# Patient Record
Sex: MALE | Race: WHITE
[De-identification: names, ages, dates, MRNs, and addresses within clinical notes are randomized per-mention and may not be internally consistent; named-entity substitution may affect disease eponyms.]

---

## 2022-11-11 ENCOUNTER — HOSPITAL ENCOUNTER (OUTPATIENT)
Dept: HOSPITAL 76 - EMS | Age: 67
Discharge: ANOTHER HOSPITAL | End: 2022-11-11
Payer: MEDICARE
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
## 2022-11-11 DIAGNOSIS — S09.90XA: Primary | ICD-10-CM

## 2022-11-11 DIAGNOSIS — R11.10: ICD-10-CM

## 2022-11-11 DIAGNOSIS — R41.89: ICD-10-CM

## 2022-12-21 ENCOUNTER — HOSPITAL ENCOUNTER (OUTPATIENT)
Dept: HOSPITAL 76 - DI | Age: 67
Discharge: HOME | End: 2022-12-21
Attending: NURSE PRACTITIONER
Payer: MEDICARE

## 2022-12-21 DIAGNOSIS — S32.000D: ICD-10-CM

## 2022-12-21 DIAGNOSIS — M47.816: ICD-10-CM

## 2022-12-21 DIAGNOSIS — I60.9: Primary | ICD-10-CM

## 2022-12-21 PROCEDURE — 72040 X-RAY EXAM NECK SPINE 2-3 VW: CPT

## 2022-12-21 PROCEDURE — 70553 MRI BRAIN STEM W/O & W/DYE: CPT

## 2022-12-21 PROCEDURE — 72100 X-RAY EXAM L-S SPINE 2/3 VWS: CPT

## 2022-12-22 NOTE — XRAY REPORT
PROCEDURE:  Lumbar Spine 2 View

 

INDICATIONS:  Compression fractures of lumbar vertebrae with routine healing, unspecified lumbar vert
ebral level, subsequent encounter

 

TECHNIQUE:  2 views of the lumbar spine were acquired.  

 

COMPARISON:  None.

 

FINDINGS:  

 

Bones:  5 non-rib-bearing vertebrae are present.    No substantial curvature of the lumbar spine. 3 m
m retrolisthesis L3 on L4. Mild superior endplate compression deformity of L2, approximately one thir
d vertebral body height loss. Minimal superior endplate compression deformity of L3, approximately 10
% vertebral body height loss. Superior endplate compression deformity of L5, approximately one quarte
r vertebral body height loss. Mild multilevel degenerative changes of the lumbar spine.

 

No suspicious bony lesions.  

 

Soft tissues:  Overlying bowel gas pattern is normal. Vascular calcifications are present.

 

IMPRESSION:  

1. Compression deformities of L2, L3, and L5.

2. Multilevel degenerative changes.

 

Reviewed by: Que Marlow MD on 12/22/2022 8:49 AM PST

Approved by: Que Marlow MD on 12/22/2022 8:49 AM PST

 

 

Station ID:  [id number]

## 2022-12-22 NOTE — MRI REPORT
PROCEDURE:  BRAIN W/WO

 

INDICATIONS:  TRAUMATIC BRAIN INJURY

 

CONTRAST: gadavist 8ml 

                       

TECHNIQUE:  

Noncontrast axial T1 spin echo, axial T2 fast spin echo, sagittal and axial FLAIR, coronal T2 fast sp
in echo, axial gradient echo, axial diffusion and ADC through the brain.  After the administration of
 contrast, axial and coronal T1 spin echo with fat saturation through the brain.  

 

COMPARISON:  None.

 

FINDINGS:  

Image quality:  Excellent.  

 

CSF spaces:  Basal cisterns are patent.  No extra-axial fluid collections.  Ventricles are normal in 
size and shape.  

 

Brain:  No midline shift.  No intracranial bleeds or masses.  No abnormal intracranial enhancement.  
There is cerebral volume loss for age.  There is very minimal periventricular white matter chronic sm
all vessel ischemic change.  The brainstem appears normal. In the right inferior frontal lobe as well
 as the right lateral temporal lobe there are multiple small foci of magnetic susceptibility artifact
 consistent with areas of gliosis and hemosiderin deposition consistent with prior traumatic brain in
jury. Reference representative images 33, 34, and 37 of series 9. 4 of these areas are identifiable o
n the axial FLAIR sequence, where there is central bright signal and peripheral low signal. Reference
 image 14/100 for the inferior frontal lesion as well as image 16/104 2 temporal lesions, one of whic
h is at the temporal tip. Brain parenchyma is otherwise within normal limits for patient age. Diffusi
on-weighted images demonstrate no acute ischemic insults.  No chronic ischemic insults.  Normal intra
vascular flow voids are present.  

 

Skull and face:  Calvarial marrow is normal in signal.  Orbits appear normal.  

 

Sinuses:  Sinuses and mastoids appear clear.  

 

IMPRESSION:  

 

1. There are multiple small areas of hemosiderin deposition in the inferior right frontal lobe and la
teral right temporal lobe which are consistent with sequelae of traumatic brain injury. Most of these
 areas are not visible on any other sequence. 4 such areas are identifiable on the FLAIR imaging.

 

2. Otherwise unremarkable appearance of the brain for patient age.

 

Reviewed by: Javier Navas MD on 12/22/2022 10:58 AM PST

Approved by: Javier Navas MD on 12/22/2022 10:58 AM PST

 

 

Station ID:  [id number]

## 2022-12-25 ENCOUNTER — HOSPITAL ENCOUNTER (OUTPATIENT)
Dept: HOSPITAL 76 - EMS | Age: 67
Discharge: ANOTHER HOSPITAL | End: 2022-12-25
Payer: MEDICARE

## 2022-12-25 DIAGNOSIS — R51.9: Primary | ICD-10-CM

## 2022-12-25 DIAGNOSIS — Z87.820: ICD-10-CM

## 2022-12-25 DIAGNOSIS — R11.0: ICD-10-CM

## 2023-02-17 ENCOUNTER — HOSPITAL ENCOUNTER (OUTPATIENT)
Dept: HOSPITAL 76 - DI | Age: 68
Discharge: HOME | End: 2023-02-17
Attending: PHYSICIAN ASSISTANT
Payer: MEDICARE

## 2023-02-17 DIAGNOSIS — S12.9XXD: Primary | ICD-10-CM

## 2023-02-17 DIAGNOSIS — M47.812: ICD-10-CM

## 2023-02-17 DIAGNOSIS — S32.000D: ICD-10-CM

## 2023-02-17 DIAGNOSIS — M50.31: ICD-10-CM

## 2023-02-17 NOTE — XRAY REPORT
PROCEDURE:  Lumbar Spine Complete

 

INDICATIONS:  CERVICAL AND LUMBAR FRACTURES

 

TECHNIQUE:  4 views of the lumbar spine were acquired.  

 

COMPARISON:  Radiograph 12/21/2022

 

FINDINGS:  

 

Bones:  5 non-rib-bearing vertebrae are present.  There is normal bony alignment. Stable compression 
deformities of the L2, L3 and L5 vertebral bodies, without endplate retropulsion. Mild, multilevel di
sc height loss. No listhesis or abnormal motion with flexion or extension.

 

Soft tissues:  Overlying bowel gas pattern is normal.  No suspicious soft tissue calcifications.  

 

IMPRESSION:  Stable compression deformities of the L2, L3 and L5 vertebral bodies, without endplate r
etropulsion. 

 

Reviewed by: Morgan Solomon on 2/17/2023 11:43 AM PST

Approved by: Morgan Solomon on 2/17/2023 11:43 AM PST

 

 

Station ID:  [id number]

## 2023-02-17 NOTE — XRAY REPORT
PROCEDURE:  Cervical Spine Complete

 

INDICATIONS:  CERVICAL AND LUMBAR FRACTURES

 

TECHNIQUE: 4 views of the cervical spine acquired.  

 

COMPARISON:  5

 

FINDINGS:  

Bones:  No fractures or dislocations to the C7 level. No listhesis or abnormal motion with flexion or
 extension. Mild disc height loss at C3-4 and C5-6 and C6-7. Mild, multilevel facet arthrosis most pr
ominent at C3-C6.

 

Soft tissues:  No prevertebral soft tissue swelling.  

 

IMPRESSION:  

1. No abnormal motion with flexion or extension. No listhesis.

2. Mild multilevel degenerative disc disease and facet arthrosis.

 

Reviewed by: Morgan Solomon on 2/17/2023 11:41 AM PST

Approved by: Morgan Solomon on 2/17/2023 11:41 AM PST

 

 

Station ID:  [id number]